# Patient Record
Sex: MALE | Race: WHITE | NOT HISPANIC OR LATINO | URBAN - METROPOLITAN AREA
[De-identification: names, ages, dates, MRNs, and addresses within clinical notes are randomized per-mention and may not be internally consistent; named-entity substitution may affect disease eponyms.]

---
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
## 2023-02-08 ENCOUNTER — EMERGENCY (EMERGENCY)
Facility: HOSPITAL | Age: 23
LOS: 1 days | Discharge: HOME | End: 2023-02-08
Admitting: EMERGENCY MEDICINE
Payer: COMMERCIAL

## 2023-02-08 VITALS
HEART RATE: 74 BPM | TEMPERATURE: 98 F | RESPIRATION RATE: 18 BRPM | OXYGEN SATURATION: 98 % | DIASTOLIC BLOOD PRESSURE: 85 MMHG | SYSTOLIC BLOOD PRESSURE: 128 MMHG

## 2023-02-08 VITALS
SYSTOLIC BLOOD PRESSURE: 122 MMHG | OXYGEN SATURATION: 100 % | DIASTOLIC BLOOD PRESSURE: 77 MMHG | HEART RATE: 85 BPM | TEMPERATURE: 98 F | RESPIRATION RATE: 18 BRPM

## 2023-02-08 PROCEDURE — 99284 EMERGENCY DEPT VISIT MOD MDM: CPT

## 2023-02-08 NOTE — ED PROVIDER NOTE - CLINICAL SUMMARY MEDICAL DECISION MAKING FREE TEXT BOX
This is a 22-year-old male past medical history depression, with complaint of increased anxiety after a disagreement with the parents.  As per EMS third-party activated 911 when he was screaming out of the window for help, while he wanted to get out the 1st floor window. Patient reports him came home from college on the weekend. 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 He told his parents  he wants to take a leave of absence. They were not happy to hear about that.   They broke up with his girlfriend and then he feels out of place in that environment, especially during the weekend when his ex-girlfriend drinking and bothering him. They started to argue about this matter with parents last night. Parents took away his phone, his computer. States his parents told him if PD will come they will tell he is crazy, hitting his grandmother, they will ruin his life. Endorses sees Dr Win Shahid and prescribed Lexapro. Talks with therapist every 2 weeks. Explicitly denies si, hi, ah, vh, floyd, psychosis, hx of psychiatric hospitalizations.  Collateral info obtained by alex, refer to the note. Parents do not have any safety concerns and will take him home.   There is no clinical evidence of intoxication, or any acute medical problem requiring immediate intervention. There are no signs of need of emergent psychiatric eval.  At present time he is  not a harm to self or others and can be safely discharge to follow up with his own psychiatrist.

## 2023-02-08 NOTE — ED PROVIDER NOTE - OBJECTIVE STATEMENT
This is a 22-year-old male past medical history depression, with complaint of increased anxiety after a disagreement with the parents.  As per EMS third-party activated 911 when he was screaming out of the window for help, while he wanted to get out the 1st floor window. Patient reports him came home from college on the weekend. 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 He told his parents  he wants to take a leave of absence. They were not happy to hear about that.   They broke up with his girlfriend and then he feels out of place in that environment, especially during the weekend when his ex-girlfriend drinking and bothering him. They started to argue about this matter with parents last night. Parents took away his phone, his computer. States his parents told him if PD will come they will tell he is crazy, hitting his grandmother, they will ruin his life. Endorses sees Dr Win Shahid and prescribed Lexapro. Talks with therapist every 2 weeks. Explicitly denies si, hi, ah, vh, floyd, psychosis, hx of psychiatric hospitalizations.

## 2023-02-08 NOTE — ED ADULT TRIAGE NOTE - CHIEF COMPLAINT QUOTE
p/t with hx depression c/o of increased depression, post verbal altercation with parents p/t denies any SI or HI, appears calm and cooperative

## 2023-02-08 NOTE — ED PROVIDER NOTE - NSFOLLOWUPINSTRUCTIONS_ED_ALL_ED_FT
You have been given information necessary to follow up with the  [facility] ([facility]) Crisis center & other outpatient  psychiatric clinics within your community    • [facility] walk in Crisis centre  [address] [place] [address] [phone] [email or web address]  Hours of operation:  Day	                                        Hours  Sunday                                  Closed  Monday                                9am - 3pm  Tuesday                                9am - 3pm  Wednesday                          9am - 3pm  Thursday                               9am - 3pm  Friday                                    9am - 3pm  Saturday                                Closed    .....additionally if your current problem is associated with drug or alcohol abuse further information can be obtained at the Drug Abuse Evaluation [facility]erral [facility] ([facility])    • [facility] clinic [address] [place] [address] [phone] [email or web address]    Additionally if more support and information and help is needed in the area of suicide prevention pleas3 feel free to contact :   • Suicide [place] [address]  Phone: 5-029-988-BQJN (8444)  Web Address: http://www.suicidepreventionlifeline.org  • Suicide Awareness Voices of Education  [address] [place], [place] [id number]  Phone: 1-340.432.8558  Web Address: http://www.save.org    Anxiety    WHAT YOU NEED TO KNOW:    Anxiety is a condition that causes you to feel extremely worried or nervous. The feelings are so strong that they can cause problems with your daily activities or sleep. Anxiety may be triggered by something you fear, or it may happen without a cause. Family or work stress, smoking, caffeine, and alcohol can increase your risk for anxiety. Certain medicines or health conditions can also increase your risk. Anxiety can become a long-term condition if it is not managed or treated.    DISCHARGE INSTRUCTIONS:    Call your local emergency number (911 in the [place]) if:    You have chest pain, tightness, or heaviness that may spread to your shoulders, arms, jaw, neck, or back.    You feel like hurting yourself or someone else.  Call your doctor if:    Your symptoms get worse or do not get better with treatment.    Your anxiety keeps you from doing your regular daily activities.    You have new symptoms since your last visit.    You have questions or concerns about your condition or care.  Medicines:    Medicines may be given to help you feel more calm and relaxed, and decrease your symptoms.    Take your medicine as directed. Contact your healthcare provider if you think your medicine is not helping or if you have side effects. Tell him of her if you are allergic to any medicine. Keep a list of the medicines, vitamins, and herbs you take. Include the amounts, and when and why you take them. Bring the list or the pill bottles to follow-up visits. Carry your medicine list with you in case of an emergency.  Manage anxiety:    Talk to someone about your anxiety. Your healthcare provider may suggest counseling. Cognitive behavioral therapy can help you understand and change how you react to events that trigger your symptoms. You might feel more comfortable talking with a friend or family member about your anxiety. Choose someone you know will be supportive and encouraging.    Find ways to relax. Activities such as exercise, meditation, or listening to music can help you relax. Spend time with friends, or do things you enjoy.    Practice deep breathing. Deep breathing can help you relax when you feel anxious. Focus on taking slow, deep breaths several times a day, or during an anxiety attack. Breathe in through your nose and out through your mouth.    Create a regular sleep routine. Regular sleep can help you feel calmer during the day. Go to sleep and wake up at the same times every day. Do not watch television or use the computer right before bed. Your room should be comfortable, dark, and quiet.    Eat a variety of healthy foods. Healthy foods include fruits, vegetables, low-fat dairy products, lean meats, fish, whole-grain breads, and cooked beans. Healthy foods can help you feel less anxious and have more energy.  Healthy Foods      Exercise regularly. Exercise can increase your energy level. Exercise may also lift your mood and help you sleep better. Your healthcare provider can help you create an exercise plan.  Walking for Exercise      Do not smoke. Nicotine and other chemicals in cigarettes and cigars can increase anxiety. Ask your healthcare provider for information if you currently smoke and need help to quit. E-cigarettes or smokeless tobacco still contain nicotine. Talk to your healthcare provider before you use these products.    Do not have caffeine. Caffeine can make your symptoms worse. Do not have foods or drinks that are meant to increase your energy level.    Limit or do not drink alcohol. Ask your healthcare provider if alcohol is safe for you. You may not be able to drink alcohol if you take certain anxiety or depression medicines. Limit alcohol to 1 drink per day if you are a woman. Limit alcohol to 2 drinks per day if you are a man. A drink of alcohol is 12 ounces of beer, 5 ounces of wine, or 1½ ounces of liquor.    Do not use drugs. Drugs can make your anxiety worse. It can also make anxiety hard to manage. Talk to your healthcare provider if you use drugs and want help to quit.  Follow up with your doctor within 2 weeks or as directed: Write down your questions so you remember to ask them during your visits.

## 2023-02-08 NOTE — ED ADULT NURSE NOTE - OBJECTIVE STATEMENT
p/t with hx depression c/o of increased depression, post verbal altercation with parents p/t denies any SI or HI, appears calm and cooperative.  PT A&OX4.  No distress noted.  Denies pain/ discomfort at this time.  Denies SI/AH/VH.  Belongings removed.  Will continue to monitor

## 2023-02-08 NOTE — ED PROVIDER NOTE - PATIENT PORTAL LINK FT
You can access the FollowMyHealth Patient Portal offered by [facility] by registering at the following website: [email or web address]. By joining [facility]’s FollowMyHealth portal, you will also be able to view your health information using other applications (apps) compatible with our system.

## 2023-02-08 NOTE — ED BEHAVIORAL HEALTH NOTE - BEHAVIORAL HEALTH NOTE
As per request of provider, writer contacted patient’s father Bryant Matthews ([phone]) to obtain collateral information. The following information is per parent.     Patient is a 21 Yo male, hx of depression, domiciled w/ parents, student at [employer]. \[facility].\"", bib EMS activated by neighbor due to the patient screaming help. As per father, patient tried running out of the first floor window after an argument and mother tried to stop him which lead to him screaming help. Father reports patient came home over the weekend from [employer]. \[facility].\"" due to stress. Patient recently started seeing a psychiatrist 3 weeks ago and was prescribed Lexapro which he has been compliant with. Patient and his family got into an argument last night and the parents took his phone and laptop. Father reports patient did not endorse any Si/Hi/AVh and has no hx of this. Stressors include losing an election at school and problems with an ex gf. Father reports that there are no safety concerns and they care comfortable with the patient returning home. Father reports patient has an apt w/ his psychiatrist on 02/14/23. No medical problems reported. Writer agreed to keep father updated. As per request of provider, writer contacted patient’s father Bryant Matthews ([phone]) to obtain collateral information. The following information is per parent.     Patient is a 21 Yo male, hx of depression, domiciled w/ parents, student at [employer], bib EMS activated by neighbor due to the patient screaming help. As per father, patient tried running out of the first floor window after an argument and mother tried to stop him which lead to him screaming help. Father reports patient came home over the weekend from [employer] due to stress. Patient recently started seeing a psychiatrist 3 weeks ago and was prescribed Lexapro which he has been compliant with. Patient and his family got into an argument last night and the parents took his phone and laptop. Father reports patient did not endorse any Si/Hi/AVh and has no hx of this. Stressors include losing an election at school and problems with an ex gf. Father reports that there are no safety concerns and they care comfortable with the patient returning home. Father reports patient has an apt w/ his psychiatrist on 02/14/23. No medical problems reported. Writer agreed to keep father updated.    Writer met with patient at bedside. Patient reports he did not feel comfortable being discharged to his parents and wants to arrange transportation independently. patient reports he has money and can uber or call a cousin or friend to stay with. Writer informed patient's parents that he would be cleared for discharge and travel independently. Family agreed to f/u with patient when he is out.

## 2023-02-08 NOTE — ED PROVIDER NOTE - PROGRESS NOTE DETAILS
NP Gifty- upon discharge pt endorsed he is not feeling comfortable to leave with his parents. He rather will go back to his dorm, via uber. He has his debit card and can pay for it. Sw notified, discussed with parents. They understood he is an adult and can make his own decisions.   Pt will arrange his on transportation.
